# Patient Record
Sex: FEMALE
[De-identification: names, ages, dates, MRNs, and addresses within clinical notes are randomized per-mention and may not be internally consistent; named-entity substitution may affect disease eponyms.]

---

## 2021-08-18 ENCOUNTER — TRANSCRIPTION ENCOUNTER (OUTPATIENT)
Age: 46
End: 2021-08-18

## 2022-01-02 ENCOUNTER — TRANSCRIPTION ENCOUNTER (OUTPATIENT)
Age: 47
End: 2022-01-02

## 2022-07-15 PROBLEM — Z00.00 ENCOUNTER FOR PREVENTIVE HEALTH EXAMINATION: Status: ACTIVE | Noted: 2022-07-15

## 2022-07-18 ENCOUNTER — APPOINTMENT (OUTPATIENT)
Dept: ORTHOPEDIC SURGERY | Facility: CLINIC | Age: 47
End: 2022-07-18

## 2022-07-18 VITALS
WEIGHT: 114 LBS | DIASTOLIC BLOOD PRESSURE: 77 MMHG | HEIGHT: 59 IN | SYSTOLIC BLOOD PRESSURE: 128 MMHG | HEART RATE: 60 BPM | BODY MASS INDEX: 22.98 KG/M2

## 2022-07-18 DIAGNOSIS — Z78.9 OTHER SPECIFIED HEALTH STATUS: ICD-10-CM

## 2022-07-18 DIAGNOSIS — Z83.3 FAMILY HISTORY OF DIABETES MELLITUS: ICD-10-CM

## 2022-07-18 DIAGNOSIS — M25.362 OTHER INSTABILITY, LEFT KNEE: ICD-10-CM

## 2022-07-18 DIAGNOSIS — Z82.61 FAMILY HISTORY OF ARTHRITIS: ICD-10-CM

## 2022-07-18 PROCEDURE — 73564 X-RAY EXAM KNEE 4 OR MORE: CPT | Mod: LT

## 2022-07-18 PROCEDURE — 99203 OFFICE O/P NEW LOW 30 MIN: CPT

## 2022-07-18 NOTE — PHYSICAL EXAM
[de-identified] : Oriented to time, place, person\par Mood: Normal\par Affect: Normal\par Appearance: Healthy, well appearing, no acute distress.\par Gait: Normal\par Assistive Devices: None\par \par Left Knee Exam:\par \par Skin: Clean, dry, intact\par Inspection: No obvious malalignment, no masses, no swelling, no effusion\par Pulses: 2+ DP/PT pulses \par ROM: 0-135 degrees of flexion. No pain with deep knee flexion/extension.\par Tenderness: No MJLT. No LJLT. No pain over the patella facets. No pain to the quadriceps tendon. No pain to the patella tendon. No posterior knee tenderness.\par Stability: Stable to varus, valgus. IA Lachman testing. Negative anterior drawer, negative posterior drawer.\par Strength: 5/5 Q/H/TA/GS/EHL, without atrophy\par Neuro: Intact to light touch throughout, DTRs normal\par Additional Tests: Negative Carlo's test, Negative patellar grind test  [de-identified] : The following radiographs were ordered and read by me during this patients visit. I reviewed each radiograph in detail with the patient and discussed the findings as highlighted below. \par \par 4 views of the left knee were obtained today, 07/18/2022, that show no acute fracture or dislocation. There is no medial, no lateral and no patellofemoral degenerative changes seen. There is no significant malalignment. No significant other obvious osseous abnormality, otherwise unremarkable.

## 2022-07-18 NOTE — ADDENDUM
[FreeTextEntry1] : This note was written by Debra Wallis on 07/18/2022 acting solely as a scribe for Dr. John Mohr.\par \par All medical record entries made by the Scribe were at my, Dr. John Mohr, direction and personally dictated by me on 07/18/2022. I have personally reviewed the chart and agree that the record accurately reflects my personal performance of the history, physical exam, assessment and plan.

## 2022-07-18 NOTE — DISCUSSION/SUMMARY
[de-identified] : 45 y/o female with left knee instability\par \par Patient presents For a left knee evaluation.  The patient reports an injury to the left knee approximately 25 years ago, and reports that she had an ACL injury at that time.  On clinical examination today the patient has a firm endpoint on examination without significant instability felt on clinical examination.  There is also no significant arthrosis seen on x-ray imaging that may be consistent with a " scarred/competent" anterior cruciate ligament.  This was discussed in detail with the patient, and we discussed consideration of athletic activity moving forward.  Patient is considering return to impact loading exercise, and is requesting bracing.\par \par Recommendations: Brace use, Rx given. NSAIDs/Ice if needed.  Consider MRI if jacque instability occurs.\par \par Follow-up as needed.

## 2022-07-18 NOTE — HISTORY OF PRESENT ILLNESS
[de-identified] : 46 year old female Hx left knee ACL tear (~25yrs ago) treated conservatively presents today for evaluation of the left knee. She would like to obtained a brace so she can start playing tennis and other sports. She was able to play sports when she was younger with [employer] brace. Denies pain, numbness or tingling.  She is currently active with known pivoting activities including running and biking.\par \par The patient's past medical history, past surgical history, medications and allergies were reviewed by me today with the patient and documented accordingly. In addition, the patient's family and social history, which were noncontributory to this visit, were reviewed also.

## 2025-01-01 ENCOUNTER — NON-APPOINTMENT (OUTPATIENT)
Age: 50
End: 2025-01-01

## 2025-06-11 ENCOUNTER — NON-APPOINTMENT (OUTPATIENT)
Age: 50
End: 2025-06-11